# Patient Record
Sex: MALE | Race: WHITE | NOT HISPANIC OR LATINO
[De-identification: names, ages, dates, MRNs, and addresses within clinical notes are randomized per-mention and may not be internally consistent; named-entity substitution may affect disease eponyms.]

---

## 2023-06-19 PROBLEM — Z00.00 ENCOUNTER FOR PREVENTIVE HEALTH EXAMINATION: Status: ACTIVE | Noted: 2023-06-19

## 2023-06-20 ENCOUNTER — APPOINTMENT (OUTPATIENT)
Dept: ORTHOPEDIC SURGERY | Facility: CLINIC | Age: 28
End: 2023-06-20
Payer: COMMERCIAL

## 2023-06-20 ENCOUNTER — NON-APPOINTMENT (OUTPATIENT)
Age: 28
End: 2023-06-20

## 2023-06-20 VITALS — WEIGHT: 160 LBS | HEIGHT: 71 IN | BODY MASS INDEX: 22.4 KG/M2

## 2023-06-20 DIAGNOSIS — S70.12XA CONTUSION OF LEFT THIGH, INITIAL ENCOUNTER: ICD-10-CM

## 2023-06-20 PROCEDURE — 99203 OFFICE O/P NEW LOW 30 MIN: CPT
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
## 2023-06-20 NOTE — PHYSICAL EXAM
[de-identified] : LEFT thigh\par \par Constitutional: \par The patient is healthy-appearing and in no apparent distress. \par \par Gait:\par The patient ambulates with a normal gait and no limp.\par \par Cardiovascular System: \par There is capillary refill less than 2 seconds. \par \par Skin: \par There is no skin abnormalities other than anterolateral ecchymosis and posteromedial thigh.\par \par LEFT thigh\par \par Bony Palpation: \par There is no tenderness of the iliac crest.\par There is no tenderness of the ASIS.\par There is no tenderness of the PSIS.\par There is no tenderness of the SI joint.\par There is no tenderness of the greater trochanter. \par \par Soft Tissue Palpation: \par There is no tenderness of the hip adductors.\par There is no tenderness of the hip abductors.\par There is no tenderness of the hamstrings. \par There is no tenderness of the piriformis. \par There is tenderness of the hip flexors.\par \par Active Range of Motion: \par There is full range of motion at the hip actively and passively. \par \par Special Tests: \par There is a negative Bernard's test.\par There is a negative Arnie-Sosa test. \par \par Strength: \par There is 5/5 hip flexion, adduction, abduction.  \par \par Psychiatric: \par The patient demonstrates a normal mood and affect and is active and alert. [de-identified] : X-ray left knee (brought by patient): There is no significant bony / soft tissue abnormality, arthritis, or fracture.\par \par Ultrasound of left hip thigh anteriorly reveals no evidence of hematoma or significant fluid collection but mild edema

## 2023-06-20 NOTE — ASSESSMENT
[FreeTextEntry1] : Discussed at length with patient exam history and imaging and recommendations time for observation and home exercises provided.  Patient is to follow-up with persistent symptoms in 1 month

## 2023-08-13 ENCOUNTER — NON-APPOINTMENT (OUTPATIENT)
Age: 28
End: 2023-08-13